# Patient Record
(demographics unavailable — no encounter records)

---

## 2024-12-02 NOTE — PHYSICAL EXAM
[No Acute Distress] : no acute distress [Normal Sclera/Conjunctiva] : normal sclera/conjunctiva [EOMI] : extra ocular movement intact [Normal Oropharynx] : the oropharynx was normal [No JVD] : no jugular venous distention [Supple] : the neck was supple [No Respiratory Distress] : no respiratory distress [Clear to Auscultation] : lungs were clear to auscultation bilaterally [No Accessory Muscle Use] : no accessory muscle use [Normal Rate] : heart rate was normal  [Normal S1, S2] : normal S1 and S2 [No Murmurs] : no murmurs heard [No Edema] : there was no peripheral edema [Normal Bowel Sounds] : normal bowel sounds [Non Tender] : non-tender [Soft] : abdomen soft [No Spinal Tenderness] : no spinal tenderness [No Joint Swelling] : no joint swelling seen [No Clubbing, Cyanosis] : no clubbing  or cyanosis of the fingernails [Normal Strength/Tone] : muscle strength and tone were normal [No Rash] : no rash [Cranial Nerves Intact] : cranial nerves 2-12 were intact [No Motor Deficits] : the motor exam was normal [No Gross Sensory Deficits] : no gross sensory deficits [Oriented x3] : oriented to person, place, and time [Normal Affect] : the affect was normal [Normal Outer Ear/Nose] : the ears and nose were normal in appearance [de-identified] : sitting on couch, RAI, AAOx3 today but some confusion at times [de-identified] : candelaria balderrama [de-identified] : scattered wheezing bl, increased WOB on exertion [de-identified] : TTP LLQ [de-identified] : under breasts

## 2024-12-02 NOTE — HEALTH RISK ASSESSMENT
[Some assistance needed] : walking [Independent] : using telephone [Full assistance needed] : managing finances [Two or more falls in past year] : Patient reported two or more falls in the past year [Yes] : The patient does have visual impairment [HRA Reviewed] : Health risk assessment reviewed [FreeTextEntry8] : walker [TimeGetUpGo] : 60

## 2024-12-02 NOTE — PHYSICAL EXAM
[No Acute Distress] : no acute distress [Normal Sclera/Conjunctiva] : normal sclera/conjunctiva [EOMI] : extra ocular movement intact [Normal Oropharynx] : the oropharynx was normal [No JVD] : no jugular venous distention [Supple] : the neck was supple [No Respiratory Distress] : no respiratory distress [Clear to Auscultation] : lungs were clear to auscultation bilaterally [No Accessory Muscle Use] : no accessory muscle use [Normal Rate] : heart rate was normal  [Normal S1, S2] : normal S1 and S2 [No Murmurs] : no murmurs heard [No Edema] : there was no peripheral edema [Normal Bowel Sounds] : normal bowel sounds [Non Tender] : non-tender [Soft] : abdomen soft [No Spinal Tenderness] : no spinal tenderness [No Joint Swelling] : no joint swelling seen [No Clubbing, Cyanosis] : no clubbing  or cyanosis of the fingernails [Normal Strength/Tone] : muscle strength and tone were normal [No Rash] : no rash [Cranial Nerves Intact] : cranial nerves 2-12 were intact [No Motor Deficits] : the motor exam was normal [No Gross Sensory Deficits] : no gross sensory deficits [Oriented x3] : oriented to person, place, and time [Normal Affect] : the affect was normal [Normal Outer Ear/Nose] : the ears and nose were normal in appearance [de-identified] : sitting on couch, RAI, AAOx3 today but some confusion at times [de-identified] : candelaria balderrama [de-identified] : scattered wheezing bl, increased WOB on exertion [de-identified] : TTP LLQ [de-identified] : under breasts

## 2024-12-02 NOTE — HISTORY OF PRESENT ILLNESS
[Patient] : patient [Family Member] : family member [FreeTextEntry1] : gait instability [FreeTextEntry2] :  12/02/2024 COVID SCREEN:Patient or caretaker denies fever, cough, trouble breathing, rash, vomiting. Patient has not been in close contact with anyone who is COVID-19 positive, or suspected of having COVID-19.   N95 mask, gloves, eye wear and gown (if indicated) used during visit: Yes.    Patient is a 94-year-old woman with PMH significant for a/fib, HTN, PMR, COPD ,hypothyroid, hyperlipidemia, h/o ischemic colitis, h/o disseminated shingles is being seen for follow up.  12/2/24 Pt has been having diarrhea every few days with more L sided stomach pain. Concerned it may be ischemic colitis? Sometimes sits on toilet unable to go, then has diarrhea after awhile.  Pt takes percocet 1-3x/day, colace daily. No senna. Wheezing - comes and goes, uses albuterol daily. Ran out of trelegy a few days ago, wheezing more. Has taken 40 mg prednisone in the past but makes her loopy. Memory worsening, waxes and wanes Sleeps all day 1-2days/wk. Pt would like to sleep more at night. Has TV on often. Not much exercise. Mood seems ok otherwise. No falls since her last visit. Urine is ok, no blood in her stools. No fever.  8/8/24 Patient reports that she fell few days ago while ambulating with a walker. Felt like her knees were buckling. Her aide picked her up. She has some soreness on the L side. Able to ambulate after the fall. Patient reports that overall, she does not feel well because she has generalized achiness and joint pains. Intermittent bloating but if she eats less, bloating improves. Has regular BMs. Pt has chronic SOB but no exacerbation, no cough, no phlegm Pt was started on Lexapro as she was having increased frequency of episodes of anxiety. As per daughter, patient has been tolerating well and has significant decrease in symptoms of anxiety. Appetite is good Not sleeping well. Some nights wakes up around 5 am, sometimes sleeps for 5-6 hours Ambulates with a walker No wounds No rcent hospitalizations

## 2024-12-02 NOTE — REASON FOR VISIT
[Follow-Up] : a follow-up visit [Pre-Visit Preparation] : pre-visit preparation was done [Formal Caregiver] : formal caregiver [FreeTextEntry1] : COPD, HTN, PMR,HLD, Insomnia, anxiety

## 2024-12-02 NOTE — REVIEW OF SYSTEMS
[Shortness Of Breath] : shortness of breath [Joint Pain] : joint pain [Joint Stiffness] : joint stiffness [Muscle Pain] : muscle pain [Unsteady Walking] : ataxia [Anxiety] : anxiety [Negative] : Heme/Lymph [FreeTextEntry4] : improved post nasal drip [Nosebleed] : no nosebleeds [Postnasal Drip] : no postnasal drip [Chest Pain] : no chest pain [Lower Ext Edema] : no lower extremity edema [Orthopnea] : no orthopnea [Wheezing] : wheezing [Cough] : no cough [Abdominal Pain] : abdominal pain [Nausea] : no nausea [Constipation] : constipation [Diarrhea] : diarrhea [FreeTextEntry7] : intermittent abdominal bloating

## 2025-01-13 NOTE — PHYSICAL EXAM
[No Acute Distress] : no acute distress [Normal Sclera/Conjunctiva] : normal sclera/conjunctiva [EOMI] : extra ocular movement intact [Normal Outer Ear/Nose] : the ears and nose were normal in appearance [Supple] : the neck was supple [No Respiratory Distress] : no respiratory distress [No Accessory Muscle Use] : no accessory muscle use [Normal Rate] : heart rate was normal  [Normal S1, S2] : normal S1 and S2 [No Murmurs] : no murmurs heard [No Edema] : there was no peripheral edema [Normal Bowel Sounds] : normal bowel sounds [Soft] : abdomen soft [No Joint Swelling] : no joint swelling seen [No Clubbing, Cyanosis] : no clubbing  or cyanosis of the fingernails [Normal Strength/Tone] : muscle strength and tone were normal [No Rash] : no rash [Cranial Nerves Intact] : cranial nerves 2-12 were intact [No Motor Deficits] : the motor exam was normal [No Gross Sensory Deficits] : no gross sensory deficits [Oriented x3] : oriented to person, place, and time [Normal Affect] : the affect was normal [de-identified] : under breasts [de-identified] : sleeping during visit, wakes easily, says "hello doctor," groaning/moaning throughout visit, at baseline per Aide and daughter [de-identified] : candlearia balderrama [de-identified] : Jessica [de-identified] : occasional scattered crackle, poor inspiratory effort, no wheezes or rails [de-identified] : RRR, no MRG, no edema [de-identified] : TTP diffuse [de-identified] : diffusely TTP, +BS [de-identified] : onfused at times, AAO

## 2025-01-13 NOTE — END OF VISIT
[FreeTextEntry3] : Documented by Tracey Mon acting as a scribe for Dr. Ashly Richards. 01/13/2025   All medical record entries made by the Scribe were at my, Dr. Ashly Richards, direction and personally dictated by me on 01/13/2025. I have reviewed the chart and agree that the record accurately reflects my personal performance of the history, physical exam, assessment and plan. I have also personally directed, reviewed, and agreed with the chart.

## 2025-01-13 NOTE — ADDENDUM
[FreeTextEntry1] : Documented by Tracey Mon acting as a scribe under Dr. Ashly Richards. 01/13/2025

## 2025-01-13 NOTE — REVIEW OF SYSTEMS
[Shortness Of Breath] : shortness of breath [Wheezing] : wheezing [Abdominal Pain] : abdominal pain [Constipation] : constipation [Diarrhea] : diarrhea [Joint Pain] : joint pain [Joint Stiffness] : joint stiffness [Muscle Pain] : muscle pain [Unsteady Walking] : ataxia [Anxiety] : anxiety [Negative] : Heme/Lymph [Nosebleed] : no nosebleeds [Postnasal Drip] : no postnasal drip [Chest Pain] : no chest pain [Lower Ext Edema] : no lower extremity edema [Orthopnea] : no orthopnea [Cough] : no cough [Nausea] : no nausea [FreeTextEntry7] : intermittent abdominal bloating and belching

## 2025-01-13 NOTE — PHYSICAL EXAM
[No Acute Distress] : no acute distress [Normal Sclera/Conjunctiva] : normal sclera/conjunctiva [EOMI] : extra ocular movement intact [Normal Outer Ear/Nose] : the ears and nose were normal in appearance [Supple] : the neck was supple [No Respiratory Distress] : no respiratory distress [No Accessory Muscle Use] : no accessory muscle use [Normal Rate] : heart rate was normal  [Normal S1, S2] : normal S1 and S2 [No Murmurs] : no murmurs heard [No Edema] : there was no peripheral edema [Normal Bowel Sounds] : normal bowel sounds [Soft] : abdomen soft [No Joint Swelling] : no joint swelling seen [No Clubbing, Cyanosis] : no clubbing  or cyanosis of the fingernails [Normal Strength/Tone] : muscle strength and tone were normal [No Rash] : no rash [Cranial Nerves Intact] : cranial nerves 2-12 were intact [No Motor Deficits] : the motor exam was normal [No Gross Sensory Deficits] : no gross sensory deficits [Oriented x3] : oriented to person, place, and time [Normal Affect] : the affect was normal [de-identified] : under breasts [de-identified] : sleeping during visit, wakes easily, says "hello doctor," groaning/moaning throughout visit, at baseline per Aide and daughter [de-identified] : candelaria balderrama [de-identified] : Jessica [de-identified] : occasional scattered crackle, poor inspiratory effort, no wheezes or rails [de-identified] : RRR, no MRG, no edema [de-identified] : TTP diffuse [de-identified] : diffusely TTP, +BS [de-identified] : onfused at times, AAO

## 2025-01-13 NOTE — HEALTH RISK ASSESSMENT
[HRA Reviewed] : Health risk assessment reviewed [Some assistance needed] : walking [Independent] : using telephone [Full assistance needed] : managing finances [Two or more falls in past year] : Patient reported two or more falls in the past year [Yes] : The patient does have visual impairment [FreeTextEntry8] : walker [TimeGetUpGo] : 60

## 2025-01-13 NOTE — HISTORY OF PRESENT ILLNESS
[Patient] : patient [Family Member] : family member [FreeTextEntry1] : gait instability [FreeTextEntry2] : 1/132025 COVID SCREEN:Patient or caretaker denies fever, cough, trouble breathing, rash, vomiting. Patient has not been in close contact with anyone who is COVID-19 positive, or suspected of having COVID-19.   N95 mask, gloves, eye wear and gown (if indicated) used during visit: Yes.    Patient is a 94-year-old woman with PMH significant for a/fib, HTN, PMR, COPD ,hypothyroid, hyperlipidemia, h/o ischemic colitis, h/o disseminated shingles is being seen for follow up.  1/13/25 Post hospital visit, has had noticeable decline. Off and on more confused, then some days is alert and oriented. Still constipated, constant groaning (baseline). Had a few good days. Yesterday was not as good, maybe another decline. On oxygen since got home. Waking in night because she's sleeping a lot during the day.  Bedsore on elbow. Has VNS QOD. Coming today.  Family considering hospice. No urine concerns, no CP, not walking much, no SOB.  12/2/24 Pt has been having diarrhea every few days with more L sided stomach pain. Concerned it may be ischemic colitis? Sometimes sits on toilet unable to go, then has diarrhea after awhile.  Pt takes percocet 1-3x/day, colace daily. No senna. Wheezing - comes and goes, uses albuterol daily. Ran out of trelegy a few days ago, wheezing more. Has taken 40 mg prednisone in the past but makes her loopy. Memory worsening, waxes and wanes Sleeps all day 1-2days/wk. Pt would like to sleep more at night. Has TV on often. Not much exercise. Mood seems ok otherwise. No falls since her last visit. Urine is ok, no blood in her stools. No fever.  8/8/24 Patient reports that she fell few days ago while ambulating with a walker. Felt like her knees were buckling. Her aide picked her up. She has some soreness on the L side. Able to ambulate after the fall. Patient reports that overall, she does not feel well because she has generalized achiness and joint pains. Intermittent bloating but if she eats less, bloating improves. Has regular BMs. Pt has chronic SOB but no exacerbation, no cough, no phlegm Pt was started on Lexapro as she was having increased frequency of episodes of anxiety. As per daughter, patient has been tolerating well and has significant decrease in symptoms of anxiety. Appetite is good Not sleeping well. Some nights wakes up around 5 am, sometimes sleeps for 5-6 hours Ambulates with a walker No wounds No rcent hospitalizations

## 2025-01-13 NOTE — REASON FOR VISIT
[Follow-Up] : a follow-up visit [Formal Caregiver] : formal caregiver [Post-hospitalization from ___ Hospital] : Post-hospitalization from [unfilled] Hospital [Admitted on: ___] : The patient was admitted on [unfilled] [Discharged on ___] : discharged on [unfilled] [Discharge Med List] : discharge medication list [Med Reconciliation] : medication reconciliation has been completed [Patient Contacted By: ____] : and contacted by [unfilled] [Pre-Visit Preparation] : Pre-visit preparation was done [FreeTextEntry3] : PNA, COPD [FreeTextEntry2] : Pt admitted with SOB Dx PNA and COPD, given BIPAP and O2, abx, then dc home. Started on miralax. [Post Hospitalization] : a post hospitalization visit [FreeTextEntry1] : COPD, HTN, PMR,HLD, Insomnia, anxiety

## 2025-02-24 NOTE — PHYSICAL EXAM
[No Acute Distress] : no acute distress [Normal Sclera/Conjunctiva] : normal sclera/conjunctiva [EOMI] : extra ocular movement intact [Normal Outer Ear/Nose] : the ears and nose were normal in appearance [No Respiratory Distress] : no respiratory distress [No Accessory Muscle Use] : no accessory muscle use [Normal Rate] : heart rate was normal  [Normal S1, S2] : normal S1 and S2 [No Murmurs] : no murmurs heard [No Edema] : there was no peripheral edema [Soft] : abdomen soft [No Joint Swelling] : no joint swelling seen [No Clubbing, Cyanosis] : no clubbing  or cyanosis of the fingernails [No Rash] : no rash [de-identified] : pt sitting initially, nonverbal, groaning, awake, becomes fatigued, lies down, short/rapid breaths [de-identified] : chiqui throughout, +UA secretions audible [de-identified] : RRR, borderline tachycardia [de-identified] : decreased BS [de-identified] : 3cm pale purple bruise on L forehead [de-identified] : groaning, nods head, non verbal

## 2025-02-24 NOTE — PHYSICAL EXAM
[No Acute Distress] : no acute distress [Normal Sclera/Conjunctiva] : normal sclera/conjunctiva [EOMI] : extra ocular movement intact [Normal Outer Ear/Nose] : the ears and nose were normal in appearance [No Respiratory Distress] : no respiratory distress [No Accessory Muscle Use] : no accessory muscle use [Normal Rate] : heart rate was normal  [Normal S1, S2] : normal S1 and S2 [No Murmurs] : no murmurs heard [No Edema] : there was no peripheral edema [Soft] : abdomen soft [No Joint Swelling] : no joint swelling seen [No Clubbing, Cyanosis] : no clubbing  or cyanosis of the fingernails [No Rash] : no rash [de-identified] : pt sitting initially, nonverbal, groaning, awake, becomes fatigued, lies down, short/rapid breaths [de-identified] : chiqui throughout, +UA secretions audible [de-identified] : RRR, borderline tachycardia [de-identified] : decreased BS [de-identified] : 3cm pale purple bruise on L forehead [de-identified] : groaning, nods head, non verbal

## 2025-02-24 NOTE — REASON FOR VISIT
[Formal Caregiver] : formal caregiver [Post-hospitalization from ___ Hospital] : Post-hospitalization from [unfilled] Hospital [Admitted on: ___] : The patient was admitted on [unfilled] [Discharged on ___] : discharged on [unfilled] [Discharge Med List] : discharge medication list [Med Reconciliation] : medication reconciliation has been completed [Patient Contacted By: ____] : and contacted by [unfilled] [Pre-Visit Preparation] : Pre-visit preparation was done [Follow-Up] : a follow-up visit [FreeTextEntry1] : COPD, HTN, PMR,HLD, Insomnia, anxiety [FreeTextEntry3] : PNA, COPD [FreeTextEntry2] : Pt admitted with SOB Dx PNA and COPD, given BIPAP and O2, abx, then dc home. Started on miralax.

## 2025-02-24 NOTE — HISTORY OF PRESENT ILLNESS
[Patient] : patient [Family Member] : family member [FreeTextEntry1] : gait instability [FreeTextEntry2] : 2/24/25 COVID SCREEN:Patient or caretaker denies fever, cough, trouble breathing, rash, vomiting. Patient has not been in close contact with anyone who is COVID-19 positive, or suspected of having COVID-19.   N95 mask, gloves, eye wear and gown (if indicated) used during visit: Yes.    Patient is a 94-year-old woman with PMH significant for a/fib, HTN, PMR, COPD ,hypothyroid, hyperlipidemia, h/o ischemic colitis, h/o disseminated shingles is being seen for follow up.  2/24/25 Pt now on hospice. Difficult weekend, agitated, yelling. No food or drink since Friday night. Now on morphine 10-15 mg, ativan 1 mg, haldol 1 mg q 4 hrs and hysocyamine .125 q 6 hrs Not taking albuterol Fell Saturday night out of bed, bruise on forehead, no changes after. Doing better today.  1/13/25 Post hospital visit, has had noticeable decline. Off and on more confused, then some days is alert and oriented. Still constipated, constant groaning (baseline). Had a few good days. Yesterday was not as good, maybe another decline. On oxygen since got home. Waking in night because she's sleeping a lot during the day.  Bedsore on elbow. Has VNS QOD. Coming today.  Family considering hospice. No urine concerns, no CP, not walking much, no SOB.  12/2/24 Pt has been having diarrhea every few days with more L sided stomach pain. Concerned it may be ischemic colitis? Sometimes sits on toilet unable to go, then has diarrhea after awhile.  Pt takes percocet 1-3x/day, colace daily. No senna. Wheezing - comes and goes, uses albuterol daily. Ran out of trelegy a few days ago, wheezing more. Has taken 40 mg prednisone in the past but makes her loopy. Memory worsening, waxes and wanes Sleeps all day 1-2days/wk. Pt would like to sleep more at night. Has TV on often. Not much exercise. Mood seems ok otherwise. No falls since her last visit. Urine is ok, no blood in her stools. No fever.  8/8/24 Patient reports that she fell few days ago while ambulating with a walker. Felt like her knees were buckling. Her aide picked her up. She has some soreness on the L side. Able to ambulate after the fall. Patient reports that overall, she does not feel well because she has generalized achiness and joint pains. Intermittent bloating but if she eats less, bloating improves. Has regular BMs. Pt has chronic SOB but no exacerbation, no cough, no phlegm Pt was started on Lexapro as she was having increased frequency of episodes of anxiety. As per daughter, patient has been tolerating well and has significant decrease in symptoms of anxiety. Appetite is good Not sleeping well. Some nights wakes up around 5 am, sometimes sleeps for 5-6 hours Ambulates with a walker No wounds No rcent hospitalizations

## 2025-02-24 NOTE — ADDENDUM
[FreeTextEntry1] : Documented by Tracey Mon acting as a scribe under Dr. Ashly Richards. 02/24/2025

## 2025-02-24 NOTE — REVIEW OF SYSTEMS
[Shortness Of Breath] : shortness of breath [Wheezing] : wheezing [Constipation] : constipation [Joint Pain] : joint pain [Muscle Pain] : muscle pain [Negative] : Psychiatric [FreeTextEntry2] : unable to answer

## 2025-02-24 NOTE — END OF VISIT
[FreeTextEntry3] : All medical record entries made by the Scribe were at my, Dr. Ashly Richards, direction and personally dictated by me. I have reviewed the chart and agree that the record accurately reflects my personal performance of the history, physical exam, assessment and plan. I have also personally directed, reviewed, and agreed with the chart.